# Patient Record
Sex: MALE | Race: WHITE | NOT HISPANIC OR LATINO | URBAN - METROPOLITAN AREA
[De-identification: names, ages, dates, MRNs, and addresses within clinical notes are randomized per-mention and may not be internally consistent; named-entity substitution may affect disease eponyms.]

---
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
## 2018-02-17 ENCOUNTER — EMERGENCY (EMERGENCY)
Facility: HOSPITAL | Age: 18
LOS: 1 days | Discharge: HOME | End: 2018-02-17
Admitting: EMERGENCY MEDICINE
Payer: SELF-PAY

## 2018-02-17 VITALS
TEMPERATURE: 98 F | DIASTOLIC BLOOD PRESSURE: 55 MMHG | HEART RATE: 93 BPM | RESPIRATION RATE: 19 BRPM | SYSTOLIC BLOOD PRESSURE: 125 MMHG | OXYGEN SATURATION: 100 %

## 2018-02-17 DIAGNOSIS — J02.9 ACUTE PHARYNGITIS, UNSPECIFIED: ICD-10-CM

## 2018-02-17 DIAGNOSIS — R07.0 PAIN IN THROAT: ICD-10-CM

## 2018-02-17 PROCEDURE — 99282 EMERGENCY DEPT VISIT SF MDM: CPT

## 2018-02-17 RX ORDER — KETOROLAC TROMETHAMINE 30 MG/ML
60 SYRINGE (ML) INJECTION ONCE
Qty: 0 | Refills: 0 | Status: COMPLETED | OUTPATIENT
Start: 2018-02-17 | End: 2018-02-17

## 2018-02-17 NOTE — ED PROVIDER NOTE - ENMT, MLM
Airway patent, Nasal mucosa clear. Slightly erythematous throat. Mouth with normal mucosa. Uvula is midline. Tolerating secretions, speaking in full sentences, no drooling, no tripoding, no tonsillar exudates.

## 2018-02-17 NOTE — ED PROVIDER NOTE - OBJECTIVE STATEMENT
18y M Pt with no PMHx presents to ED with sore throat since this morning. Pt reports he woke up this morning after a night of heavy drinking with the pain in his throat. Pain worse when swallowing. Denies cough or fever.

## 2018-02-17 NOTE — ED ADULT TRIAGE NOTE - CHIEF COMPLAINT QUOTE
Pt states he has throat pain and its decreasing his ease to breath. Pt states he has panic attacks.  Pt airway is open and no swelling is seen in triage.

## 2018-02-17 NOTE — ED PROVIDER NOTE - PROGRESS NOTE DETAILS
pt states he feels better and refusing toradol, does not want medication. also does not want to wait for rapid strep.  pt well appearing and tolerating PO, no VS deraignment

## 2018-05-16 ENCOUNTER — EMERGENCY (EMERGENCY)
Facility: HOSPITAL | Age: 18
LOS: 1 days | Discharge: HOME | End: 2018-05-16
Attending: EMERGENCY MEDICINE | Admitting: EMERGENCY MEDICINE
Payer: SELF-PAY

## 2018-05-16 VITALS
WEIGHT: 185.19 LBS | SYSTOLIC BLOOD PRESSURE: 110 MMHG | DIASTOLIC BLOOD PRESSURE: 63 MMHG | TEMPERATURE: 98 F | HEART RATE: 65 BPM | RESPIRATION RATE: 18 BRPM | OXYGEN SATURATION: 97 %

## 2018-05-16 DIAGNOSIS — H66.92 OTITIS MEDIA, UNSPECIFIED, LEFT EAR: ICD-10-CM

## 2018-05-16 DIAGNOSIS — Z79.2 LONG TERM (CURRENT) USE OF ANTIBIOTICS: ICD-10-CM

## 2018-05-16 DIAGNOSIS — H92.02 OTALGIA, LEFT EAR: ICD-10-CM

## 2018-05-16 PROCEDURE — 99283 EMERGENCY DEPT VISIT LOW MDM: CPT

## 2018-05-16 RX ORDER — AMOXICILLIN 250 MG/5ML
1 SUSPENSION, RECONSTITUTED, ORAL (ML) ORAL
Qty: 30 | Refills: 0 | OUTPATIENT
Start: 2018-05-16 | End: 2018-05-25

## 2018-05-16 NOTE — ED ADULT NURSE NOTE - OBJECTIVE STATEMENT
c/o left ear pain x3days. denies fever/chills/drainage/cough. sts "Its more uncomfrtable than pain and its a riniging.". also requesting std/sti checup. will cont to lavonior. safety maintained

## 2018-05-16 NOTE — ED PROVIDER NOTE - OBJECTIVE STATEMENT
17 y/o Male presents to the ED c/o of left ear pain for a few days. Pt denies fevers, chills, nasal congestion and headaches. Pt flies helicopters often.

## 2019-06-13 NOTE — ED ADULT TRIAGE NOTE - NS ED NURSE BANDS TYPE
At [facility], one important tool we use to improve our patient services is our Patient Survey.  Following your visit you may receive our survey in the mail.    Please take the time to complete the survey.    If your visit with us was great, we want to hear about it.    If we can improve, please let us know how.            Name band;